# Patient Record
Sex: FEMALE
[De-identification: names, ages, dates, MRNs, and addresses within clinical notes are randomized per-mention and may not be internally consistent; named-entity substitution may affect disease eponyms.]

---

## 2021-11-23 ENCOUNTER — APPOINTMENT (OUTPATIENT)
Dept: ENDOCRINOLOGY | Facility: CLINIC | Age: 36
End: 2021-11-23
Payer: COMMERCIAL

## 2021-11-23 VITALS
BODY MASS INDEX: 25.35 KG/M2 | SYSTOLIC BLOOD PRESSURE: 112 MMHG | HEIGHT: 61.5 IN | HEART RATE: 95 BPM | DIASTOLIC BLOOD PRESSURE: 70 MMHG | WEIGHT: 136 LBS

## 2021-11-23 PROBLEM — Z00.00 ENCOUNTER FOR PREVENTIVE HEALTH EXAMINATION: Status: ACTIVE | Noted: 2021-11-23

## 2021-11-23 PROCEDURE — 99205 OFFICE O/P NEW HI 60 MIN: CPT | Mod: 25

## 2021-11-23 PROCEDURE — 99417 PROLNG OP E/M EACH 15 MIN: CPT

## 2021-11-23 RX ORDER — LEVOCETIRIZINE DIHYDROCHLORIDE 5 MG/1
5 TABLET ORAL
Refills: 0 | Status: ACTIVE | COMMUNITY

## 2022-01-11 ENCOUNTER — APPOINTMENT (OUTPATIENT)
Dept: ENDOCRINOLOGY | Facility: CLINIC | Age: 37
End: 2022-01-11
Payer: COMMERCIAL

## 2022-01-11 DIAGNOSIS — R89.9 UNSPECIFIED ABNORMAL FINDING IN SPECIMENS FROM OTHER ORGANS, SYSTEMS AND TISSUES: ICD-10-CM

## 2022-01-11 PROCEDURE — 99214 OFFICE O/P EST MOD 30 MIN: CPT | Mod: 95

## 2022-01-11 RX ORDER — PREDNISONE 10 MG/1
10 TABLET ORAL
Refills: 0 | Status: DISCONTINUED | COMMUNITY
End: 2022-01-11

## 2022-01-13 ENCOUNTER — NON-APPOINTMENT (OUTPATIENT)
Age: 37
End: 2022-01-13

## 2022-07-11 ENCOUNTER — APPOINTMENT (OUTPATIENT)
Dept: ENDOCRINOLOGY | Facility: CLINIC | Age: 37
End: 2022-07-11

## 2022-07-11 VITALS
WEIGHT: 120 LBS | HEIGHT: 61 IN | BODY MASS INDEX: 22.66 KG/M2 | SYSTOLIC BLOOD PRESSURE: 102 MMHG | HEART RATE: 75 BPM | OXYGEN SATURATION: 98 % | DIASTOLIC BLOOD PRESSURE: 72 MMHG

## 2022-07-11 DIAGNOSIS — N95.1 MENOPAUSAL AND FEMALE CLIMACTERIC STATES: ICD-10-CM

## 2022-07-11 PROCEDURE — 99214 OFFICE O/P EST MOD 30 MIN: CPT

## 2022-07-11 RX ORDER — CRISABOROLE 20 MG/G
2 OINTMENT TOPICAL
Qty: 60 | Refills: 0 | Status: ACTIVE | COMMUNITY
Start: 2022-06-22

## 2022-07-11 RX ORDER — ESTRADIOL/NORETHINDRONE ACETATE TRANSDERMAL SYSTEM .05; .25 MG/D; MG/D
0.05-0.25 PATCH, EXTENDED RELEASE TRANSDERMAL
Qty: 8 | Refills: 0 | Status: ACTIVE | COMMUNITY
Start: 2022-05-09

## 2022-09-12 ENCOUNTER — RX RENEWAL (OUTPATIENT)
Age: 37
End: 2022-09-12

## 2023-01-17 ENCOUNTER — APPOINTMENT (OUTPATIENT)
Dept: ENDOCRINOLOGY | Facility: CLINIC | Age: 38
End: 2023-01-17

## 2023-07-21 ENCOUNTER — APPOINTMENT (OUTPATIENT)
Dept: ENDOCRINOLOGY | Facility: CLINIC | Age: 38
End: 2023-07-21
Payer: COMMERCIAL

## 2023-07-21 DIAGNOSIS — E28.39 OTHER PRIMARY OVARIAN FAILURE: ICD-10-CM

## 2023-07-21 DIAGNOSIS — E03.9 HYPOTHYROIDISM, UNSPECIFIED: ICD-10-CM

## 2023-07-21 PROCEDURE — 99214 OFFICE O/P EST MOD 30 MIN: CPT | Mod: 95

## 2023-09-12 ENCOUNTER — RX RENEWAL (OUTPATIENT)
Age: 38
End: 2023-09-12

## 2023-10-04 ENCOUNTER — NON-APPOINTMENT (OUTPATIENT)
Age: 38
End: 2023-10-04

## 2024-06-10 ENCOUNTER — RX RENEWAL (OUTPATIENT)
Age: 39
End: 2024-06-10

## 2024-06-10 RX ORDER — LEVOTHYROXINE SODIUM 0.11 MG/1
112 TABLET ORAL
Qty: 14 | Refills: 0 | Status: ACTIVE | COMMUNITY
Start: 2022-09-12 | End: 1900-01-01

## 2024-07-31 ENCOUNTER — APPOINTMENT (OUTPATIENT)
Dept: ENDOCRINOLOGY | Facility: CLINIC | Age: 39
End: 2024-07-31

## 2024-09-16 LAB — TSH SERPL-ACNC: 2.51

## 2024-09-17 ENCOUNTER — APPOINTMENT (OUTPATIENT)
Dept: ENDOCRINOLOGY | Facility: CLINIC | Age: 39
End: 2024-09-17

## 2024-09-17 NOTE — HISTORY OF PRESENT ILLNESS
[FreeTextEntry1] : here for POI and hypothyroidism  interval history no labs does not feel any different was sick one month ago  taking synthroid SLICK 112mcg, takes appropriately, no missing doses ordered as LT4 from [facility]  premature ovarian failure LMP: currently on estrogen patch plans for pregnancy: none, did not keep frozen embryos in storage 2016 tried to get pregnant, has low egg reserve had to go through donor egg, via IVF x 2 pregnancies 4yo daughter, 35 weeks born 2yo son, 36 weeks born

## 2024-09-17 NOTE — ASSESSMENT
[FreeTextEntry1] : 38F w/ POI, urticaria and hypothyroidism due to Hashimoto's\par  rtc on 6months with lab\par  take meds at night and check labs in 1 month\par  \par  Hypothyroidism- tsh off but patient is not symptomatic. ?resolving thyroiditis. take synthroid at night, 3hrs after the last meal (no snacking) and recheck labs\par  -continue synthroid SLICK 112mcg, but if still off, might have to try gluten free diet with the same dose vs increasing dose\par  -neg TSI, low levels of TPO Ab\par  \par  POI, infertility, IVF (kids are from donor eggs)\par  -discussed implications and fertility\par  -on HRT\par  -normal chromosomal analysis\par  -neg for turners/17alpha hydroxylase deficiency/autoimmune polyglandular syndrome/fragile x\par  -can see genetic counselor for further testing, can get me records\par  -likely autoimmune related\par  \par

## 2025-01-15 ENCOUNTER — NON-APPOINTMENT (OUTPATIENT)
Age: 40
End: 2025-01-15

## 2025-06-18 LAB — TSH SERPL-ACNC: 1

## 2025-06-19 ENCOUNTER — APPOINTMENT (OUTPATIENT)
Dept: ENDOCRINOLOGY | Facility: CLINIC | Age: 40
End: 2025-06-19
Payer: COMMERCIAL

## 2025-06-19 VITALS
OXYGEN SATURATION: 99 % | HEIGHT: 61 IN | DIASTOLIC BLOOD PRESSURE: 70 MMHG | HEART RATE: 82 BPM | WEIGHT: 123 LBS | SYSTOLIC BLOOD PRESSURE: 102 MMHG | BODY MASS INDEX: 23.22 KG/M2

## 2025-06-19 PROCEDURE — 99215 OFFICE O/P EST HI 40 MIN: CPT

## 2025-06-19 PROCEDURE — G2211 COMPLEX E/M VISIT ADD ON: CPT | Mod: NC

## 2025-08-04 ENCOUNTER — RX RENEWAL (OUTPATIENT)
Age: 40
End: 2025-08-04